# Patient Record
Sex: MALE | Race: WHITE | Employment: OTHER | URBAN - METROPOLITAN AREA
[De-identification: names, ages, dates, MRNs, and addresses within clinical notes are randomized per-mention and may not be internally consistent; named-entity substitution may affect disease eponyms.]

---

## 2021-06-03 PROBLEM — E87.5 HYPERKALEMIA: Status: ACTIVE | Noted: 2020-02-26

## 2021-06-03 PROBLEM — G60.9 IDIOPATHIC PERIPHERAL NEUROPATHY: Status: ACTIVE | Noted: 2021-06-03

## 2021-06-03 PROBLEM — M25.50 MULTIPLE JOINT PAIN: Status: ACTIVE | Noted: 2021-06-03

## 2021-06-03 PROBLEM — F90.0 ATTENTION DEFICIT HYPERACTIVITY DISORDER, PREDOMINANTLY INATTENTIVE TYPE: Status: ACTIVE | Noted: 2021-06-03

## 2021-06-03 PROBLEM — I10 ESSENTIAL HYPERTENSION: Status: ACTIVE | Noted: 2021-06-03

## 2021-06-03 PROBLEM — M72.2 PLANTAR FASCIITIS: Status: ACTIVE | Noted: 2021-06-03

## 2021-06-03 PROBLEM — R31.0 GROSS HEMATURIA: Status: ACTIVE | Noted: 2019-08-03

## 2021-06-03 PROBLEM — E78.5 HYPERLIPIDEMIA: Status: ACTIVE | Noted: 2021-06-03

## 2021-06-03 PROBLEM — N32.0 BLADDER OUTLET OBSTRUCTION: Status: ACTIVE | Noted: 2020-02-26

## 2021-06-03 PROBLEM — R97.20 RAISED PROSTATE SPECIFIC ANTIGEN: Status: ACTIVE | Noted: 2021-06-03

## 2022-04-05 PROBLEM — F34.1 DYSTHYMIA: Status: ACTIVE | Noted: 2022-04-05

## 2022-04-05 PROBLEM — R53.83 FATIGUE: Status: ACTIVE | Noted: 2022-04-05

## 2022-04-05 PROBLEM — Z90.79 S/P TURP (TRANSURETHRAL RESECTION OF PROSTATE): Status: ACTIVE | Noted: 2022-03-17

## 2022-08-02 ENCOUNTER — HOSPITAL ENCOUNTER (OUTPATIENT)
Dept: PHYSICAL THERAPY | Age: 82
Discharge: HOME | End: 2022-08-02
Payer: MEDICARE

## 2022-08-02 PROCEDURE — 97116 GAIT TRAINING THERAPY: CPT

## 2022-08-02 PROCEDURE — 97162 PT EVAL MOD COMPLEX 30 MIN: CPT

## 2022-08-02 NOTE — PROGRESS NOTES
[address] [place] [address] PHYSICAL THERAPY AT [address] [place] [address]  [facility], [address] ,[address], [address]  Phone: [phone]  Fax: [phone] / [address] [place] [address] PHYSICAL THERAPY SERVICES  Patient Name: Marv Arias :    Medical   Diagnosis: Gait instability [R26.81] Treatment Diagnosis: Gait instability    Onset Date: 2022      Referral Source: Elliot Wright, * Start of Care [facility]): 2022   Prior Hospitalization: See medical history Provider #: [id number]   Prior Level of Function: Previous fall history   Comorbidities: H/o depression, arthritis, HTN, hearing & visual impairment, CHF, TIA   Medications: Verified on Patient Summary List   The Plan of Care and following information is based on the information from the initial evaluation.   ==================================================================================  Assessment / key information:  Patient is a pleasant 80 y.o. male who presents to [facility] with c/o imbalance. Patient's wife reports progressive worsening of balance over this past year, specifically since most recent hospitalization for an aneurysm 2022. He lives in a 2 story home with his wife & resides on the bottom floor given difficulty with negotiating stairs to access the room of the garage. He is able to ascend the stairs using L handrail & 2 handed technique & nonrec pattern. He is currently ambulating with roller walker for limited ambulation outside of his home & for all household ambulation. He reports at least 2 falls this past year, 1 incident when he tripped over an object on the floor & required assist from his wife to stand. He reports second incident when he fell out of chair but had difficulty recalling circumstances around this fall. He denies c/o pain & reports primary c/o weakness in B LE & poor stamina overall.  Upon objective evaluation, patient demonstrates impaired of hip & ankle balance strategies, protective extension strategy is absent. MMT revealed  overall B LE strength at 4/5. He scored 25/56 on Dunn Balance Assessment indicating r/o fall with ADLs. He ambulates with rolling walker with forward trunk flexed posture as well as L lateral shift. V/c for upright posture & safe/appropriate distance from RW with ambulation around clinic. Patient can benefit PT interventions to improve posture, decrease risk of fall with ADLs & gait & to improve gait mechanics to facilitate return to unlimited ADLs, work activities & overall functional status.   ==================================================================================  Eval Complexity: History HIGH Complexity :3+ comorbidities / personal factors will impact the outcome/ POC ;  Examination  MEDIUM Complexity : 3 Standardized tests and measures addressing body structure, function, activity limitation and / or participation in recreation ; Presentation MEDIUM Complexity : Evolving with changing characteristics ;   Decision Making MEDIUM Complexity : FOTO score of 26-74; Overall Complexity MEDIUM  Problem List: pain affecting function, decrease ROM, decrease strength, edema affecting function, impaired gait/ balance, decrease ADL/ functional abilitiies, decrease activity tolerance, decrease flexibility/ joint mobility, and decrease transfer abilities   Treatment Plan may include any combination of the following: Therapeutic exercise, Therapeutic activities, Neuromuscular re-education, Physical agent/modality, Gait/balance training, Manual therapy, Aquatic therapy, Self Care training, Functional mobility training, Home safety training, and Stair training  Patient / Family readiness to learn indicated by: asking questions, trying to perform skills and interest  Persons(s) to be included in education: patient (P) and family support person (FSP);list wife  Barriers to Learning/Limitations: None  Measures taken:    Patient Goal (s): \"Strength & better mobility, balance\"   Patient self reported health status: fair  Rehabilitation Potential: fair  Short Term Goals: To be accomplished in  2  weeks:  1) Establish HEP to prevent further disability. 2) Patient will be able to maintain mSR with eyes opened for 30 seconds indicating improved use of hip balance strategy for safety with ambulation in challenging environments. 3) Patient will report no further incidence of falls with use of fall prevention techniques at home. 4) Patient will ambulate ~30 feet without v/c for upright posture & safety with use of RW. Long Term Goals: To be accomplished in  4  weeks:  1) Improve FOTO score from 53 points to > or = 56 points indicating improved tolerance with ADLs. 2) Patient to improve score on Dunn balance assessment from 25/56 to > or = 30/56 indicating decreased r/o fall with ADLs. 3) Patient will be able to maintain B SR with eyes opened for 16 seconds indicating improved use of hip balance strategy for safety with ambulation in challenging environments. 4) Improve B LE strength so that is patient able to negotiate steps using B hand rail & nonrec pattern to improve safety with stair negotiation at home. Frequency / Duration:   Patient to be seen  2-3  times per week for 4  weeks:  Patient / Caregiver education and instruction: self care, activity modification, brace/ splint application and exercises    Therapist Signature: HOANG Casillas cert MDT Date: 6/6/0959   Certification Period: 8-02-22 to 10-31-22 Time: 12:56 PM   =================================================================================  I certify that the above Physical Therapy Services are being furnished while the patient is under my care. I agree with the treatment plan and certify that this therapy is necessary.     Physician Signature:                                                             Date: Time:                                                                       Eric Bosworth, *

## 2022-08-02 NOTE — PROGRESS NOTES
PHYSICAL THERAPY - DAILY TREATMENT NOTE    Patient Name: Smitha Michel        Date: 2022  : 1940   YES Patient  Verified  Visit #:     Insurance: Payor: Jasmyn Henry / Plan: [place] MED[place] PART [place] & B / Product Type: Medicare /      In time: 12:50 P Out time: 1:35 P   Total Treatment Time: 45     BCBS/Medicare Time Tracking (below)   Total Timed Codes (min):  15 1:1 Treatment Time:  45     TREATMENT AREA =  Gait instability [R26.81]  SUBJECTIVE  Pain Level (on 0 to 10 scale):  0  / 10   Medication Changes/New allergies or changes in medical history, any new surgeries or procedures? NO    If yes, update Summary List   Subjective Functional Status/Changes:  []  No changes reported     See POC            Modalities Rationale:        min [] Estim, type/location:                                      []  att     []  unatt     []  w/US     []  w/ice    []  w/heat    min []  Mechanical Traction: type/lbs                   []  pro   []  sup   []  int   []  cont    []  before manual    []  after manual    min []  Ultrasound, settings/location:      min []  Iontophoresis w/ dexamethasone, location:                                               []  take home patch       []  in clinic    min []  Ice     []  Heat    location/position:     min []  Vasopneumatic Device, press/temp:    If using vaso (only need to measure limb vaso being performed on)      pre-treatment girth :       post-treatment girth :       measured at (landmark location) :      min []  Other:    [] Skin assessment post-treatment (if applicable):    []  intact    []  redness- no adverse reaction                  []redness - adverse reaction:        10 min Gait Training:  __50_ feet x 2 with __RW_ device on level surfaces with __SBA_ level of assistance, reviewed appropriate use of RW, v/c for upright posture as well as appropriate spacing between pt & RW with ambulation & with stand to sit transfers   Rationale:  To improve ambulation safety and efficiency in order to improve patient's ability to safely ambulate at home for self care. Billed With/As:   [] TE   [] TA   [] Neuro   [] Self Care Patient Education: [x] Review HEP    [] Progressed/Changed HEP based on:   [] positioning   [] body mechanics   [] transfers   [] heat/ice application    [] other:      Other Objective/Functional Measures:    See POC     Post Treatment Pain Level (on 0 to 10) scale:   0  / 10     ASSESSMENT  Assessment/Changes in Function:     See POC      []  See Progress Note/Recertification   Patient will continue to benefit from skilled PT services to modify and progress therapeutic interventions, address functional mobility deficits, address ROM deficits, address strength deficits, analyze and address soft tissue restrictions, analyze and cue movement patterns, analyze and modify body mechanics/ergonomics, assess and modify postural abnormalities, address imbalance/dizziness, and instruct in home and community integration to attain remaining goals.    Progress toward goals / Updated goals:    Progressing towards goals established at [address] [facility] [id number]  []  Upgrade activities as tolerated YES Continue plan of care   []  Discharge due to :    []  Other:      Therapist: Carlos Oconnell, PT    Date: 8/2/2022 Time: 6:44 PM     Future Appointments   Date Time Provider Porsha Adames   8/12/2022 10:30 AM Rosalie Das, PT [facility]   8/15/2022 10:30 AM [facility]

## 2022-08-12 ENCOUNTER — HOSPITAL ENCOUNTER (OUTPATIENT)
Dept: PHYSICAL THERAPY | Age: 82
Discharge: HOME | End: 2022-08-12
Payer: MEDICARE

## 2022-08-12 PROCEDURE — 97112 NEUROMUSCULAR REEDUCATION: CPT | Performed by: PHYSICAL THERAPIST

## 2022-08-12 PROCEDURE — 97110 THERAPEUTIC EXERCISES: CPT | Performed by: PHYSICAL THERAPIST

## 2022-08-12 NOTE — PROGRESS NOTES
PHYSICAL THERAPY - DAILY TREATMENT NOTE    Patient Name: Catrachito Rainey        Date: 2022  : 1940   YES Patient  Verified  Visit #:     Insurance: Payor: Rohini Dixon / Plan: [place] MEDICARE PART A & B / Product Type: Medicare /      In time: 10:30 Out time: 11:15   Total Treatment Time: 45     BCBS/Medicare Time Tracking (below)   Total Timed Codes (min):  45 1:1 Treatment Time:  45     TREATMENT AREA =  Gait instability [R26.81]    SUBJECTIVE  Pain Level (on 0 to 10 scale):  0-5  / 10   Medication Changes/New allergies or changes in medical history, any new surgeries or procedures? NO    If yes, update Summary List   Subjective Functional Status/Changes:  []  No changes reported     Pt reports lower hurt this morning, but no having any pain now.            Modalities Rationale:     -NA   min [] Estim, type/location:                                      []  att     []  unatt     []  w/US     []  w/ice    []  w/heat    min []  Mechanical Traction: type/lbs                   []  pro   []  sup   []  int   []  cont    []  before manual    []  after manual    min []  Ultrasound, settings/location:      min []  Iontophoresis w/ dexamethasone, location:                                               []  take home patch       []  in clinic    min []  Ice     []  Heat    location/position:     min []  Vasopneumatic Device, press/temp:     min []  Other:    [] Skin assessment post-treatment (if applicable):    []  intact    []  redness- no adverse reaction     []redness - adverse reaction:        30 min Therapeutic Exercise:  [x]  See flow sheet   Rationale:      increase ROM, increase strength, and improve coordination to improve the patients ability to increase activity tolerance       15 min Neuromuscular Re-ed: [x]  See flow sheet   Rationale:    improve coordination, improve balance, and increase proprioception to improve the patients ability to prevent falls    - min Gait Training:  ___ feet with ___ device on level surfaces with ___ level of assistance   Rationale: To improve ambulation safety and efficiency in order to improve patient's ability to safely ambulate at home for self care. Billed With/As:   [] TE   [] TA   [] Neuro   [] Self Care Patient Education: [x] Review HEP    [] Progressed/Changed HEP based on:   [] positioning   [] body mechanics   [] transfers   [] heat/ice application    [] other:      Other Objective/Functional Measures: Added LE AORM/strengthening/endurance and balance exercises per flowsheet     Post Treatment Pain Level (on 0 to 10) scale:   0  / 10     ASSESSMENT  Assessment/Changes in Function:     Able to complete all new exercises with only complains of fatigue, no back pain. Issued written HEP and shown wife how to help him with set-up in kitchen at sink for safety. []  See Progress Note/Recertification   Patient will continue to benefit from skilled PT services to modify and progress therapeutic interventions, address functional mobility deficits, address ROM deficits, address strength deficits, analyze and address soft tissue restrictions, analyze and cue movement patterns, analyze and modify body mechanics/ergonomics, assess and modify postural abnormalities, and address imbalance/dizziness to attain remaining goals. Progress toward goals / Updated goals: Showing good tolerance to low level LE exercises.      PLAN  [x]  Upgrade activities as tolerated YES Continue plan of care   []  Discharge due to :    []  Other:      Therapist: Damien Lamar PT    Date: 8/12/2022 Time: 7:38 AM     Future Appointments   Date Time Provider Porsha Adames   8/12/2022 10:30 AM Mitali Mcknight, PT [facility]   8/15/2022 10:30 AM [facility]

## 2022-08-15 ENCOUNTER — HOSPITAL ENCOUNTER (OUTPATIENT)
Dept: PHYSICAL THERAPY | Age: 82
Discharge: HOME | End: 2022-08-15
Payer: MEDICARE

## 2022-08-15 PROCEDURE — 97110 THERAPEUTIC EXERCISES: CPT | Performed by: PHYSICAL THERAPIST

## 2022-08-15 PROCEDURE — 97112 NEUROMUSCULAR REEDUCATION: CPT | Performed by: PHYSICAL THERAPIST

## 2022-08-15 NOTE — PROGRESS NOTES
PHYSICAL THERAPY - DAILY TREATMENT NOTE    Patient Name: Farrukh Brand        Date: 8/15/2022  : 1940   YES Patient  Verified  Visit #:   3   of   12  Insurance: Payor: Rosita Henry / Plan: [place] MEDICARE PART A & B / Product Type: Medicare /      In time: 10:30 Out time: 11:15   Total Treatment Time: 45     BCBS/Medicare Time Tracking (below)   Total Timed Codes (min):  45 1:1 Treatment Time:  45     TREATMENT AREA =  Gait instability [R26.81]    SUBJECTIVE  Pain Level (on 0 to 10 scale):  0-5  / 10   Medication Changes/New allergies or changes in medical history, any new surgeries or procedures? NO    If yes, update Summary List   Subjective Functional Status/Changes:  []  No changes reported     Pt reports noticing his balance has been improving since starting PT.   He gets tired some with HEP          Modalities Rationale:     -NA   min [] Estim, type/location:                                      []  att     []  unatt     []  w/US     []  w/ice    []  w/heat    min []  Mechanical Traction: type/lbs                   []  pro   []  sup   []  int   []  cont    []  before manual    []  after manual    min []  Ultrasound, settings/location:      min []  Iontophoresis w/ dexamethasone, location:                                               []  take home patch       []  in clinic    min []  Ice     []  Heat    location/position:     min []  Vasopneumatic Device, press/temp:     min []  Other:    [] Skin assessment post-treatment (if applicable):    []  intact    []  redness- no adverse reaction     []redness - adverse reaction:        35 min Therapeutic Exercise:  [x]  See flow sheet   Rationale:      increase ROM, increase strength, and improve coordination to improve the patients ability to increase activity tolerance       10 min Neuromuscular Re-ed: [x]  See flow sheet   Rationale:    improve coordination, improve balance, and increase proprioception to improve the patients ability to prevent falls    - min Gait Training:  ___ feet with ___ device on level surfaces with ___ level of assistance   Rationale: To improve ambulation safety and efficiency in order to improve patient's ability to safely ambulate at home for self care. Billed With/As:   [] TE   [] TA   [] Neuro   [] Self Care Patient Education: [x] Review HEP    [] Progressed/Changed HEP based on:   [] positioning   [] body mechanics   [] transfers   [] heat/ice application    [] other:      Other Objective/Functional Measures:    Pt had some increased sway when performing HHT in quiet standing. He had increased stability with FTEO on flat ground. Post Treatment Pain Level (on 0 to 10) scale:   0  / 10     ASSESSMENT  Assessment/Changes in Function:     Pt able to perform therex today without as mch for rest breaks. He noted fatigue in thigh, but no pain in his back. []  See Progress Note/Recertification   Patient will continue to benefit from skilled PT services to modify and progress therapeutic interventions, address functional mobility deficits, address ROM deficits, address strength deficits, analyze and address soft tissue restrictions, analyze and cue movement patterns, analyze and modify body mechanics/ergonomics, assess and modify postural abnormalities, and address imbalance/dizziness to attain remaining goals. Progress toward goals / Updated goals:    Making good progress with regaining static standing balance and overall LE endurance.      PLAN  [x]  Upgrade activities as tolerated YES Continue plan of care   []  Discharge due to :    []  Other:      Therapist: Rae Watkins, PT    Date: 8/15/2022 Time: 7:38 AM     Future Appointments   Date Time Provider Porsha Adames   8/15/2022 10:30 AM Stefan Conner, PT [facility]

## 2022-08-17 ENCOUNTER — HOSPITAL ENCOUNTER (OUTPATIENT)
Dept: PHYSICAL THERAPY | Age: 82
Discharge: HOME | End: 2022-08-17
Payer: MEDICARE

## 2022-08-17 PROCEDURE — 97112 NEUROMUSCULAR REEDUCATION: CPT

## 2022-08-17 PROCEDURE — 97110 THERAPEUTIC EXERCISES: CPT

## 2022-08-17 NOTE — PROGRESS NOTES
PHYSICAL THERAPY - DAILY TREATMENT NOTE    Patient Name: Nikolai Dorman        Date: 2022  : 1940   YES Patient  Verified  Visit #:      12  Insurance: Payor: Bryant Keyes / Plan: [place] MEDICARE PART A & B / Product Type: Medicare /      In time: 68 Out time:    Total Treatment Time: 40     BCBS/Medicare Time Tracking (below)   Total Timed Codes (min):  40 1:1 Treatment Time:  40     TREATMENT AREA =  Gait instability [R26.81]    SUBJECTIVE  Pain Level (on 0 to 10 scale):  0  / 10   Medication Changes/New allergies or changes in medical history, any new surgeries or procedures?     NO    If yes, update Summary List   Subjective Functional Status/Changes:  []  No changes reported     Not doing my best today after some issues at the dentist yesterday          Modalities Rationale:     decrease inflammation, decrease pain and increase tissue extensibility to improve patient's ability to perform ADLs   min [] Estim, type/location:                                     []  att     []  unatt     []  w/US     []  w/ice    []  w/heat    min []  Mechanical Traction: type/lbs                   []  pro   []  sup   []  int   []  cont    []  before manual    []  after manual    min []  Ultrasound, settings/location:      min []  Iontophoresis w/ dexamethasone, location:                                               []  take home patch       []  in clinic    min []  Ice     []  Heat    location/position:     min []  Vasopneumatic Device, press/temp: If using vaso (only need to measure limb vaso being performed on)      pre-treatment girth :       post-treatment girth :       measured at (landmark location) :      min []  Other:    [x] Skin assessment post-treatment (if applicable):    [x]  intact    [x]  redness- no adverse reaction     []redness - adverse reaction:        10 min Therapeutic Exercise:  [x]  See flow sheet   Rationale:      increase ROM and increase strength to improve the patients ability to perform unlimted ADLs     30 min Neuromuscular Re-ed: [x]  See flow sheet   Rationale:    improve coordination, improve balance, and increase proprioception to improve the patients postural awareness, stability and motor control    Billed With/As:   [x] TE   [] TA   [x] Neuro   [] Self Care Patient Education: [x] Review HEP    [] Progressed/Changed HEP based on:   [] positioning   [] body mechanics   [] transfers   [] heat/ice application    [] other:      Other Objective/Functional Measures:    See FS     Post Treatment Pain Level (on 0 to 10) scale:   0  / 10     ASSESSMENT  Assessment/Changes in Function:     Repeated cues needed to assume upright posture, glut recruitment to correct forward trunk lean/collapse, and look ahead not down to improve stability during all standing exercises. []  See Progress Note/Recertification   Patient will continue to benefit from skilled PT services to modify and progress therapeutic interventions, address functional mobility deficits, address ROM deficits, address strength deficits, analyze and address soft tissue restrictions, analyze and cue movement patterns, analyze and modify body mechanics/ergonomics, assess and modify postural abnormalities, address imbalance/dizziness and instruct in home and community integration to attain remaining goals.    Progress toward goals / Updated goals:    Progressing towards STG1 and 2     PLAN  [x]  Upgrade activities as tolerated YES Continue plan of care   []  Discharge due to :    []  Other:      Therapist: Vera Wright, PT, DPT, MTC, CMTPT    Date: 8/17/2022 Time: 7:08 PM     Future Appointments   Date Time Provider Porsha Adames   8/23/2022 10:30 AM Quin Espinoza PT [facility]   8/25/2022 11:45 AM Adolfo David PT [facility]   8/30/2022 11:15 AM [facility]   9/1/2022 11:45 AM KORI MarkPTKATHI [facility]   9/6/2022 10:15 AM KORI Mark [facility]   9/8/2022 10:15 AM Ramez Dhaliwal [facility]   9/13/2022 10:15 AM Abby Barron, PT [facility]   9/15/2022 10:15 AM Hanna Conrad, PT [facility]

## 2022-08-23 ENCOUNTER — HOSPITAL ENCOUNTER (OUTPATIENT)
Dept: PHYSICAL THERAPY | Age: 82
Discharge: HOME | End: 2022-08-23
Payer: MEDICARE

## 2022-08-23 PROCEDURE — 97530 THERAPEUTIC ACTIVITIES: CPT | Performed by: PHYSICAL THERAPIST

## 2022-08-23 PROCEDURE — 97110 THERAPEUTIC EXERCISES: CPT | Performed by: PHYSICAL THERAPIST

## 2022-08-23 NOTE — PROGRESS NOTES
PHYSICAL THERAPY - DAILY TREATMENT NOTE    Patient Name: Racquel Rogers        Date: 2022  : 1940   YES Patient  Verified  Visit #:      of   12  Insurance: Payor: Linda Arguelloors / Plan: [place] MEDICARE PART A & B / Product Type: Medicare /      In time: 10:30 Out time: 11:10   Total Treatment Time: 40     BCBS/Medicare Time Tracking (below)   Total Timed Codes (min):  40 1:1 Treatment Time:  40     TREATMENT AREA =  Gait instability [R26.81]    SUBJECTIVE  Pain Level (on 0 to 10 scale):  0-5  / 10   Medication Changes/New allergies or changes in medical history, any new surgeries or procedures? NO    If yes, update Summary List   Subjective Functional Status/Changes:  []  No changes reported     Pt reports wlaing in his house withotu walker the other day, and got a little anxious once he relaized it. He was able to walk, holding onto walls furniture without incident.           Modalities Rationale:     -NA   min [] Estim, type/location:                                      []  att     []  unatt     []  w/US     []  w/ice    []  w/heat    min []  Mechanical Traction: type/lbs                   []  pro   []  sup   []  int   []  cont    []  before manual    []  after manual    min []  Ultrasound, settings/location:      min []  Iontophoresis w/ dexamethasone, location:                                               []  take home patch       []  in clinic    min []  Ice     []  Heat    location/position:     min []  Vasopneumatic Device, press/temp:     min []  Other:    [] Skin assessment post-treatment (if applicable):    []  intact    []  redness- no adverse reaction     []redness - adverse reaction:        30 min Therapeutic Exercise:  [x]  See flow sheet   Rationale:      increase ROM, increase strength, and improve coordination to improve the patients ability to increase activity tolerance     10 min Therapeutic Activity:  Education on energy conservation and understanding of safety in using RW on days when he hurts or has less energy   Rationale:      increase ROM, increase strength, and improve coordination to improve the patients ability to increase activity tolerance     - min Neuromuscular Re-ed: []  See flow sheet   Rationale:    improve coordination, improve balance, and increase proprioception to improve the patients ability to prevent falls    - min Gait Training:  ___ feet with ___ device on level surfaces with ___ level of assistance   Rationale: To improve ambulation safety and efficiency in order to improve patient's ability to safely ambulate at home for self care. Billed With/As:   [] TE   [] TA   [] Neuro   [] Self Care Patient Education: [x] Review HEP    [] Progressed/Changed HEP based on:   [] positioning   [] body mechanics   [] transfers   [] heat/ice application    [] other:      Other Objective/Functional Measures:    Pt had a sharp L lower back pain on 3rd rep of L foot 4 inch toe tap today    Held several exercises due to pain/overall decrease energy levels    Educated on home safety and energy conservation     Post Treatment Pain Level (on 0 to 10) scale:   0  / 10     ASSESSMENT  Assessment/Changes in Function:     Pt having more pain and decreased tolerance to exercises today. He appeared to start to fall asleep when doing his standing heel raise exercises. T was given more rest breaks today, but ultimately decided to hold off several exercises. []  See Progress Note/Recertification   Patient will continue to benefit from skilled PT services to modify and progress therapeutic interventions, address functional mobility deficits, address ROM deficits, address strength deficits, analyze and address soft tissue restrictions, analyze and cue movement patterns, analyze and modify body mechanics/ergonomics, assess and modify postural abnormalities, and address imbalance/dizziness to attain remaining goals.    Progress toward goals / Updated goals:    Minimal progress today due to pain and decreased energy levels     PLAN  [x]  Upgrade activities as tolerated YES Continue plan of care   []  Discharge due to :    []  Other:      Therapist: Timur Sandoval, PT    Date: 8/23/2022 Time: 7:38 AM     Future Appointments   Date Time Provider Porsha Adames   8/23/2022 10:30 AM Gabe Ignacio, PT [facility]   8/25/2022 11:45 AM Kathy Odom, PT [facility]   8/30/2022 11:15 AM Gabe Ignacio, PT [facility]   9/1/2022 11:45 AM Kathy Odom, PT [facility]   9/6/2022 10:15 AM Kathy Odom, PT [facility]   9/8/2022 10:15 AM Kathy Odom, PT [facility]   9/13/2022 10:15 AM Kathy Odom, PT [facility]   9/15/2022 10:15 AM Hermes Conrad, PT [facility]

## 2022-08-25 ENCOUNTER — HOSPITAL ENCOUNTER (OUTPATIENT)
Dept: PHYSICAL THERAPY | Age: 82
Discharge: HOME | End: 2022-08-25
Payer: MEDICARE

## 2022-08-25 PROCEDURE — 97110 THERAPEUTIC EXERCISES: CPT

## 2022-08-25 PROCEDURE — 97530 THERAPEUTIC ACTIVITIES: CPT

## 2022-08-25 NOTE — PROGRESS NOTES
PHYSICAL THERAPY - DAILY TREATMENT NOTE    Patient Name: Nikolai Dorman        Date: 2022  : 1940   YES Patient  Verified  Visit #:      Insurance: Payor: Bryant Keyes / Plan: [place] MEDICARE PART A & B / Product Type: Medicare /      In time: 11:50 A Out time: 12:30 P   Total Treatment Time: 40     BCBS/Medicare Time Tracking (below)   Total Timed Codes (min):  40 1:1 Treatment Time:  40     TREATMENT AREA =  Gait instability [R26.81]    SUBJECTIVE  Pain Level (on 0 to 10 scale):  7  / 10   Medication Changes/New allergies or changes in medical history, any new surgeries or procedures? NO    If yes, update Summary List   Subjective Functional Status/Changes:  []  No changes reported     Patient's wife reports she gives him tramadol for his lower back pain, but this seems to make him sleepy. He has not had any tramadol today.             Modalities Rationale:        min [] Estim, type/location:                                      []  att     []  unatt     []  w/US     []  w/ice    []  w/heat    min []  Mechanical Traction: type/lbs                   []  pro   []  sup   []  int   []  cont    []  before manual    []  after manual    min []  Ultrasound, settings/location:      min []  Iontophoresis w/ dexamethasone, location:                                               []  take home patch       []  in clinic    min []  Ice     []  Heat    location/position:     min []  Vasopneumatic Device, press/temp:    If using vaso (only need to measure limb vaso being performed on)      pre-treatment girth :       post-treatment girth :       measured at (landmark location) :      min []  Other:    [] Skin assessment post-treatment (if applicable):    []  intact    []  redness- no adverse reaction                  []redness - adverse reaction:        30 min Therapeutic Exercise:  [x]  See flow sheet   Rationale:      increase ROM and increase strength to improve the patients ability to return to safe ADLs with decreased fall risk      10 min Therapeutic Activity: [x]  See flow sheet   Rationale:    improve coordination, improve balance, and increase proprioception to improve the patients ability to return to safe sit to stand transfers from chair to walker      Billed With/As:   [] TE   [] TA   [] Neuro   [] Self Care Patient Education: [x] Review HEP    [] Progressed/Changed HEP based on:   [] positioning   [] body mechanics   [] transfers   [] heat/ice application    [] other:      Other Objective/Functional Measures: Added seated hip flexion & Nustep today (at end of treatment)     Post Treatment Pain Level (on 0 to 10) scale:   7  / 10     ASSESSMENT  Assessment/Changes in Function:     Improved tolerance today, patient able to follow commands & less c/o fatigue. V/c required for upright posture for all standing exercises & rest breaks due to c/o LBP today      []  See Progress Note/Recertification   Patient will continue to benefit from skilled PT services to modify and progress therapeutic interventions, address functional mobility deficits, address ROM deficits, address strength deficits, analyze and address soft tissue restrictions, analyze and cue movement patterns, analyze and modify body mechanics/ergonomics, assess and modify postural abnormalities, and instruct in home and community integration to attain remaining goals.    Progress toward goals / Updated goals:    Progressing towards STG 3     PLAN  []  Upgrade activities as tolerated YES Continue plan of care   []  Discharge due to :    []  Other:      Therapist: Zena Hurley PT    Date: 8/25/2022 Time: 1:04 PM     Future Appointments   Date Time Provider Porsha Adames   8/30/2022 11:15 AM Yani Dee, PT [facility]   9/1/2022 11:45 AM [facility], PT [facility]   9/6/2022 10:15 AM [facility], PT [facility]   9/8/2022 10:15 AM [facility], PT [facility]   9/13/2022 10:15 AM Johanny Conrad, PT [facility]   9/15/2022 10:15 AM Vipul Conrad, PT [facility]

## 2022-08-30 ENCOUNTER — HOSPITAL ENCOUNTER (OUTPATIENT)
Dept: PHYSICAL THERAPY | Age: 82
Discharge: HOME | End: 2022-08-30
Payer: MEDICARE

## 2022-08-30 PROCEDURE — 97110 THERAPEUTIC EXERCISES: CPT | Performed by: PHYSICAL THERAPIST

## 2022-08-30 PROCEDURE — 97112 NEUROMUSCULAR REEDUCATION: CPT | Performed by: PHYSICAL THERAPIST

## 2022-08-30 NOTE — PROGRESS NOTES
PHYSICAL THERAPY - DAILY TREATMENT NOTE    Patient Name: Kiara Glass        Date: 2022  : 1940   YES Patient  Verified  Visit #:     Insurance: Payor: Thomas Velasquez / Plan: [place] MEDICARE PART A & B / Product Type: Medicare /      In time: 11:20 Out time: 12:00   Total Treatment Time: 40     BCBS/Medicare Time Tracking (below)   Total Timed Codes (min):  40 1:1 Treatment Time:  40     TREATMENT AREA =  Gait instability [R26.81]    SUBJECTIVE  Pain Level (on 0 to 10 scale):  5  / 10   Medication Changes/New allergies or changes in medical history, any new surgeries or procedures? NO    If yes, update Summary List   Subjective Functional Status/Changes:  []  No changes reported     Pt reports feeling \"stiff all over\" when first awakening this morning.           Modalities Rationale:     -NA   min [] Estim, type/location:                                      []  att     []  unatt     []  w/US     []  w/ice    []  w/heat    min []  Mechanical Traction: type/lbs                   []  pro   []  sup   []  int   []  cont    []  before manual    []  after manual    min []  Ultrasound, settings/location:      min []  Iontophoresis w/ dexamethasone, location:                                               []  take home patch       []  in clinic    min []  Ice     []  Heat    location/position:     min []  Vasopneumatic Device, press/temp:     min []  Other:    [] Skin assessment post-treatment (if applicable):    []  intact    []  redness- no adverse reaction     []redness - adverse reaction:        25 min Therapeutic Exercise:  [x]  See flow sheet   Rationale:      increase ROM, increase strength, and improve coordination to improve the patients ability to increase activity tolerance     - min Therapeutic Activity:  -   Rationale:      increase ROM, increase strength, and improve coordination to improve the patients ability to increase activity tolerance     15 min Neuromuscular Re-ed: [x] See flow sheet   Rationale:    improve coordination, improve balance, and increase proprioception to improve the patients ability to prevent falls    - min Gait Training:  ___ feet with ___ device on level surfaces with ___ level of assistance   Rationale: To improve ambulation safety and efficiency in order to improve patient's ability to safely ambulate at home for self care. Billed With/As:   [] TE   [] TA   [] Neuro   [] Self Care Patient Education: [x] Review HEP    [] Progressed/Changed HEP based on:   [] positioning   [] body mechanics   [] transfers   [] heat/ice application    [] other:      Other Objective/Functional Measures:    Pt educated on standing with staggered stance for increased stability when standing without holding onto RW (ie washing wash/dentures etc). Post Treatment Pain Level (on 0 to 10) scale:   0  / 10     ASSESSMENT  Assessment/Changes in Function:     Pt had improved exercise tolerance today. He was limited by fatigue, not by LBP. []  See Progress Note/Recertification   Patient will continue to benefit from skilled PT services to modify and progress therapeutic interventions, address functional mobility deficits, address ROM deficits, address strength deficits, analyze and address soft tissue restrictions, analyze and cue movement patterns, analyze and modify body mechanics/ergonomics, assess and modify postural abnormalities, and address imbalance/dizziness to attain remaining goals. Progress toward goals / Updated goals:    Improving tolerance to LE exercise and standing stability.      PLAN  [x]  Upgrade activities as tolerated YES Continue plan of care   []  Discharge due to :    []  Other:      Therapist: Damien Lamar PT    Date: 8/30/2022 Time: 7:38 AM     Future Appointments   Date Time Provider Porsha Adames   8/30/2022 11:15 AM Mitali Mcknight PT [facility]   9/1/2022 11:45 AM [facility]   9/6/2022 10:15 AM Hanna Conrad, PT [facility]   9/8/2022 10:15 AM Davide Berman, PT [facility]   9/13/2022 10:15 AM Davide Berman, PT [facility]   9/15/2022 10:15 AM Paul Conrad, PT [facility]

## 2022-09-01 ENCOUNTER — APPOINTMENT (OUTPATIENT)
Dept: PHYSICAL THERAPY | Age: 82
End: 2022-09-01
Payer: MEDICARE

## 2022-09-01 ENCOUNTER — TELEPHONE (OUTPATIENT)
Dept: PHYSICAL THERAPY | Age: 82
End: 2022-09-01

## 2022-09-06 ENCOUNTER — HOSPITAL ENCOUNTER (OUTPATIENT)
Dept: PHYSICAL THERAPY | Age: 82
Discharge: HOME | End: 2022-09-06
Payer: MEDICARE

## 2022-09-06 PROCEDURE — 97530 THERAPEUTIC ACTIVITIES: CPT

## 2022-09-06 PROCEDURE — 97112 NEUROMUSCULAR REEDUCATION: CPT

## 2022-09-06 PROCEDURE — 97110 THERAPEUTIC EXERCISES: CPT

## 2022-09-06 NOTE — PROGRESS NOTES
[address] [place] [address] PHYSICAL THERAPY AT [address] [place] [address] [facility], [address] [place] [address], [address]  Phone: [phone]  Fax: [phone]  PROGRESS NOTE  Patient Name: Kavya Perez :    Treatment/Medical Diagnosis: Gait instability [R26.81]   Referral Source: Lydia Singh, *     Date of Initial Visit: 22 Attended Visits: 7 Missed Visits: 1     SUMMARY OF TREATMENT  Therapeutic exercise including ROM, stretching, gentle strengthening, gait & balance training, postural ed, patient education, HEP instruction. CURRENT STATUS  Mr. Sean Zarate continues to make slow but steady progress with PT, tolerance to prolonged standing/upright activities continues to limited due to LBP, he continues to ambulate with RW for safety for all distances, v/c for upright posture, improved use of ankle balance strategies with PT. Tolerance to exercise continues to slowly improve. Goal/Measure of Progress Goal Met? 1.  Establish HEP to prevent further disability. Status at last Eval: NA Current Status: HEP established  yes   2. Patient will be able to maintain mSR with eyes opened for 30 seconds indicating improved use of hip balance strategy for safety with ambulation in challenging environments. Status at last Eval: unable Current Status: Patient able to maintain ROM position x 30 s progressing   3. Patient will report no further incidence of falls with use of fall prevention techniques at home. Status at last Eval: NA Current Status: No recent reported fall history yes   4. Patient will ambulate ~30 feet without v/c for upright posture & safety with use of RW. Status at last Eval: Forward flexed posture with ambulation in clinic Current Status: Patient continues to require v/c for all standing & ambulation in clinic Progressing      New Goals to be achieved in __3-4__  weeks:  1.   Patient will be able to maintain mSR with eyes opened for 30 seconds indicating improved use of hip balance strategy for safety with ambulation in challenging environments. 2.  Patient to improve score on Dunn balance assessment from 25/56 to > or = 30/56 indicating decreased r/o fall with ADLs. 3.  Improve B LE strength so that is patient able to negotiate steps using B hand rail & nonrec pattern to improve safety with stair negotiation at home. 4.  Improve FOTO score from 53 points to > or = 56 points indicating improved tolerance with ADLs. RECOMMENDATIONS  Patient can benefit from continued PT interventions in order to progress towards achieving towards all LTGs. If you have any questions/comments please contact us directly at [phone]. Thank you for allowing us to assist in the care of your patient. Therapist Signature: HOANG Pennington, cert MDT Date: 1/5/5979   Certification Period:  Reporting Period: 9-06-22 to 12-04-22 8-02-22 to 9-06-22 Time: 2:01 PM     NOTE TO PHYSICIAN:  PLEASE COMPLETE THE ORDERS BELOW AND FAX TO   [facility] Physical [facility]: [phone]. If you are unable to process this request in 24 hours please contact our office: [phone].    ___ I have read the above report and request that my patient continue as recommended.   ___ I have read the above report and request that my patient continue therapy with the following changes/special instructions:_________________________________________________________   ___ I have read the above report and request that my patient be discharged from therapy.      Physician Signature:                                                             Date:                                     Time:                                                                       Sonny Pino, *

## 2022-09-06 NOTE — PROGRESS NOTES
PHYSICAL THERAPY - DAILY TREATMENT NOTE    Patient Name: Farrukh Brand        Date: 2022  : 1940   YES Patient  Verified  Visit #:     Insurance: Payor: Rosita Henry / Plan: [place] MEDICARE PART A & B / Product Type: Medicare /      In time: 10:15 A Out time: 11:15 A   Total Treatment Time: 50     BCBS/Medicare Time Tracking (below)   Total Timed Codes (min):  50 1:1 Treatment Time:  45     TREATMENT AREA =  Gait instability [R26.81]    SUBJECTIVE  Pain Level (on 0 to 10 scale):  6  / 10   Medication Changes/New allergies or changes in medical history, any new surgeries or procedures?     NO    If yes, update Summary List   Subjective Functional Status/Changes:  []  No changes reported     See progress note to MD            Modalities Rationale:     Patient deferred   min [] Estim, type/location:                                      []  att     []  unatt     []  w/US     []  w/ice    []  w/heat    min []  Mechanical Traction: type/lbs                   []  pro   []  sup   []  int   []  cont    []  before manual    []  after manual    min []  Ultrasound, settings/location:      min []  Iontophoresis w/ dexamethasone, location:                                               []  take home patch       []  in clinic    min []  Ice     []  Heat    location/position:     min []  Vasopneumatic Device, press/temp:    If using vaso (only need to measure limb vaso being performed on)      pre-treatment girth :       post-treatment girth :       measured at (landmark location) :      min []  Other:    [] Skin assessment post-treatment (if applicable):    []  intact    []  redness- no adverse reaction                  []redness - adverse reaction:        20/  15 min Therapeutic Exercise:  [x]  See flow sheet   Rationale:      increase ROM and increase strength to improve the patients ability to tolerate prolonged standing with good posture     20 min Therapeutic Activity: [x]  See flow sheet Rationale:    increase ROM, increase strength, and increase proprioception to improve the patients ability to perform sit to stand transfers with supervision for safety    10 min Neuromuscular Re-ed: [x]  See flow sheet   Rationale:    improve coordination, improve balance, and increase proprioception to improve the patients ability to perform ADLs with decreased fall risk       Billed With/As:   [] TE   [] TA   [] Neuro   [] Self Care Patient Education: [x] Review HEP    [] Progressed/Changed HEP based on:   [] positioning   [] body mechanics   [] transfers   [] heat/ice application    [] other:      Other Objective/Functional Measures:    ROM 30 secs with eyes opened (arms at side)     Post Treatment Pain Level (on 0 to 10) scale:   0  / 10     ASSESSMENT  Assessment/Changes in Function:     Patient continues to require v/c for upright posture for all standing & balance exercises      []  See Progress Note/Recertification   Patient will continue to benefit from skilled PT services to modify and progress therapeutic interventions, address functional mobility deficits, address ROM deficits, address strength deficits, analyze and address soft tissue restrictions, analyze and cue movement patterns, analyze and modify body mechanics/ergonomics, assess and modify postural abnormalities, and instruct in home and community integration to attain remaining goals.    Progress toward goals / Updated goals:    Progressing towards STG 2, 4     PLAN  []  Upgrade activities as tolerated YES Continue plan of care   []  Discharge due to :    []  Other:      Therapist: Selene Brambila PT    Date: 9/6/2022 Time: 1:55 PM     Future Appointments   Date Time Provider Porsha Adames   9/8/2022 10:15 AM Brody Fine PT [facility]   9/13/2022 10:15 AM Brody Fine PT [facility]   9/15/2022 10:15 AM Tom Conrad PT [facility]

## 2022-09-08 ENCOUNTER — HOSPITAL ENCOUNTER (OUTPATIENT)
Dept: PHYSICAL THERAPY | Age: 82
Discharge: HOME | End: 2022-09-08
Payer: MEDICARE

## 2022-09-08 PROCEDURE — 97110 THERAPEUTIC EXERCISES: CPT

## 2022-09-08 PROCEDURE — 97530 THERAPEUTIC ACTIVITIES: CPT

## 2022-09-08 PROCEDURE — 97116 GAIT TRAINING THERAPY: CPT

## 2022-09-08 NOTE — PROGRESS NOTES
PHYSICAL THERAPY - DAILY TREATMENT NOTE    Patient Name: Lizandro Chavarria        Date: 2022  : 1940   YES Patient  Verified  Visit #:     Insurance: Payor: Dimitri Risk / Plan: [place] MED[place]ARE PART A & B / Product Type: Medicare /      In time: 10:15 A Out time: 11:00 A   Total Treatment Time: 40     BCBS/Medicare Time Tracking (below)   Total Timed Codes (min):  40 1:1 Treatment Time:  40     TREATMENT AREA =  Gait instability [R26.81]    SUBJECTIVE  Pain Level (on 0 to 10 scale):  0  / 10   Medication Changes/New allergies or changes in medical history, any new surgeries or procedures? NO    If yes, update Summary List   Subjective Functional Status/Changes:  []  No changes reported     Patient reports that his lower back is not hurting today, he feels like he walks better since adjusting walker to a higher position.            Modalities Rationale:        min [] Estim, type/location:                                      []  att     []  unatt     []  w/US     []  w/ice    []  w/heat    min []  Mechanical Traction: type/lbs                   []  pro   []  sup   []  int   []  cont    []  before manual    []  after manual    min []  Ultrasound, settings/location:      min []  Iontophoresis w/ dexamethasone, location:                                               []  take home patch       []  in clinic    min []  Ice     []  Heat    location/position:     min []  Vasopneumatic Device, press/temp:    If using vaso (only need to measure limb vaso being performed on)      pre-treatment girth :       post-treatment girth :       measured at (landmark location) :      min []  Other:    [] Skin assessment post-treatment (if applicable):    []  intact    []  redness- no adverse reaction                  []redness - adverse reaction:        15 min Therapeutic Exercise:  [x]  See flow sheet   Rationale:      increase ROM and increase strength to improve the patients ability to tolerate standing with improved posture     15 min Therapeutic Activity: [x]  See flow sheet   Rationale:    improve coordination, improve balance, and increase proprioception to improve the patients ability to perform sit to stand transfers      10 min Gait Training:  __200_ feet with __RW_ device on level surfaces with _supervision__ level of assistance   Rationale: To improve ambulation safety and efficiency in order to improve patient's ability to safely ambulate at home for self care. Billed With/As:   [] TE   [] TA   [] Neuro   [] Self Care Patient Education: [x] Review HEP    [] Progressed/Changed HEP based on:   [] positioning   [] body mechanics   [] transfers   [] heat/ice application    [] other:      Other Objective/Functional Measures:    See flow sheet     Post Treatment Pain Level (on 0 to 10) scale:   0  / 10     ASSESSMENT  Assessment/Changes in Function:     No increase in LBP today, v/c required for upright posture for gait training as well as maintaining speed with ambulation      []  See Progress Note/Recertification   Patient will continue to benefit from skilled PT services to modify and progress therapeutic interventions, address functional mobility deficits, address ROM deficits, address strength deficits, analyze and address soft tissue restrictions, analyze and cue movement patterns, analyze and modify body mechanics/ergonomics, assess and modify postural abnormalities, address imbalance/dizziness, and instruct in home and community integration to attain remaining goals.    Progress toward goals / Updated goals:    Progressing towards newly established LTGs      PLAN  []  Upgrade activities as tolerated YES Continue plan of care   []  Discharge due to :    []  Other:      Therapist: Kristen Mckinnon PT    Date: 9/8/2022 Time: 11:43 AM     Future Appointments   Date Time Provider Porsha Adames   9/13/2022 10:15 AM Bassem Shaikh, PT [facility]   9/15/2022 10:15 AM Damien Conrad, PT [facility]

## 2022-09-13 ENCOUNTER — HOSPITAL ENCOUNTER (OUTPATIENT)
Dept: PHYSICAL THERAPY | Age: 82
Discharge: HOME | End: 2022-09-13
Payer: MEDICARE

## 2022-09-13 PROCEDURE — 97116 GAIT TRAINING THERAPY: CPT

## 2022-09-13 PROCEDURE — 97110 THERAPEUTIC EXERCISES: CPT

## 2022-09-13 PROCEDURE — 97112 NEUROMUSCULAR REEDUCATION: CPT

## 2022-09-13 NOTE — PROGRESS NOTES
PHYSICAL THERAPY - DAILY TREATMENT NOTE    Patient Name: Chika Cross        Date: 2022  : 1940   YES Patient  Verified  Visit #:   10   of   12  Insurance: Payor: Hardeep Granger / Plan: [place] MEDICARE PART A & B / Product Type: Medicare /      In time: 10:20 A Out time: 11:05 A   Total Treatment Time: 40     BCBS/Medicare Time Tracking (below)   Total Timed Codes (min):  40 1:1 Treatment Time:  40     TREATMENT AREA =  Gait instability [R26.81]    SUBJECTIVE  Pain Level (on 0 to 10 scale):  5  / 10   Medication Changes/New allergies or changes in medical history, any new surgeries or procedures? NO    If yes, update Summary List   Subjective Functional Status/Changes:  []  No changes reported     Patient reports he has some pain in lower back. He has a hard time making L turns when he is walking, he loses his balance more than when he turns to the R with his walker.           Modalities Rationale:     Patient deferred   min [] Estim, type/location:                                      []  att     []  unatt     []  w/US     []  w/ice    []  w/heat    min []  Mechanical Traction: type/lbs                   []  pro   []  sup   []  int   []  cont    []  before manual    []  after manual    min []  Ultrasound, settings/location:      min []  Iontophoresis w/ dexamethasone, location:                                               []  take home patch       []  in clinic    min []  Ice     []  Heat    location/position:     min []  Vasopneumatic Device, press/temp:    If using vaso (only need to measure limb vaso being performed on)      pre-treatment girth :       post-treatment girth :       measured at (landmark location) :      min []  Other:    [] Skin assessment post-treatment (if applicable):    []  intact    []  redness- no adverse reaction                  []redness - adverse reaction:        10 min Therapeutic Exercise:  [x]  See flow sheet   Rationale:      increase ROM and increase strength to improve the patients ability to return to pain-free upright standing     15 min Neuromuscular Re-ed: [x]  See flow sheet   Rationale:    improve coordination, improve balance, and increase proprioception to improve the patients ability to return to safe ADLs with decreased fall risk     15 min Gait Training:  _see flow sheet__ feet with _RW__ device on level surfaces with __SBA_ level of assistance   Rationale: To improve ambulation safety and efficiency in order to improve patient's ability to safely ambulate at home for self care. Billed With/As:   [] TE   [] TA   [] Neuro   [] Self Care Patient Education: [x] Review HEP    [] Progressed/Changed HEP based on:   [] positioning   [] body mechanics   [] transfers   [] heat/ice application    [] other:      Other Objective/Functional Measures: Added gait training (using 4 cones) with emphasis on left turns (square position)     Post Treatment Pain Level (on 0 to 10) scale:   5  / 10     ASSESSMENT  Assessment/Changes in Function:     Good gait pivot turn with use of RW today when turning to L, patient continues to require v/c for upright posture, good tolerance to progression of static balance exercises      []  See Progress Note/Recertification   Patient will continue to benefit from skilled PT services to modify and progress therapeutic interventions, address functional mobility deficits, address ROM deficits, address strength deficits, analyze and address soft tissue restrictions, analyze and cue movement patterns, analyze and modify body mechanics/ergonomics, assess and modify postural abnormalities, and instruct in home and community integration to attain remaining goals.    Progress toward goals / Updated goals:    Progressing towards LTG 1     PLAN  []  Upgrade activities as tolerated YES Continue plan of care   []  Discharge due to :    []  Other:      Therapist: Elias Johnson PT    Date: 9/13/2022 Time: 11:00 AM     Future Appointments   Date Time Provider Porsha Adames   9/15/2022 10:15 AM Prisca Conrad, PT [facility]

## 2022-09-15 ENCOUNTER — HOSPITAL ENCOUNTER (OUTPATIENT)
Dept: PHYSICAL THERAPY | Age: 82
Discharge: HOME | End: 2022-09-15
Payer: MEDICARE

## 2022-09-15 PROCEDURE — 97110 THERAPEUTIC EXERCISES: CPT

## 2022-09-15 PROCEDURE — 97535 SELF CARE MNGMENT TRAINING: CPT

## 2022-09-15 PROCEDURE — 97116 GAIT TRAINING THERAPY: CPT

## 2022-09-15 NOTE — PROGRESS NOTES
[facility] [address] [place], [place] [address], [address] [place] [address], [address]  Phone: [phone]  Fax: [phone] FOR PHYSICAL THERAPY          Patient Name: Farrukh Nicolas :    Treatment/Medical Diagnosis: Gait instability [R26.81]   Onset Date: 2022    Referral Source: Dorie Ngo, * Start of Care [facility]): 22   Prior Hospitalization: See Medical History Provider #: [id number]   Prior Level of Function: Previous fall history   Comorbidities: H/o depression, arthritis, HTN, hearing & visual impairment, CHF, TIA   Medications: Verified on Patient Summary List   Visits from [facility]: 11 Missed Visits: 1     Goal/Measure of Progress Goal Met? 1. Patient will be able to maintain mSR with eyes opened for 30 seconds indicating improved use of hip balance strategy for safety with ambulation in challenging environments. Status at last Eval: unable Current Status: B mSR 30 secs eyes opened yes   2. Patient to improve score on Dunn balance assessment from 25/56 to > or = 30/56 indicating decreased r/o fall with ADLs. Status at last Eval: 25 Current Status: 32/56 yes   3. Improve B LE strength so that is patient able to negotiate steps using B hand rail & nonrec pattern to improve safety with stair negotiation at home. Status at last Eval: unable Current Status: Patient able as ascend using B HR & rec pattern, descend using nonrec pattern yes   4. Improve FOTO score from 53 points to > or = 56 points indicating improved tolerance with ADLs. Status at last Eval: 48 Current Status: 60 yes     Key Functional Changes/Progress: Mr. Marita Barton has made good progress with PT, he demonstrates improved use of ankle/hip balance strategies & primarily uses RW for support for all ambulation. Discussed use of rollator walker to improve tolerance & stamina with prolonged upright activites & taking rest breaks in sitting.  We also dicussed transition to senior based fitness program (I.e. at the recreational center) to improve consistency with weekly generalized exercise. Assessments/Recommendations: Discontinue therapy. Progressing towards or have reached established goals. If you have any questions/comments please contact us directly at [phone]. Thank you for allowing us to assist in the care of your patient.     Therapist Signature: HOANG Hickey, cert MDT Date: 9-76-02   Reporting Period:  Time: 11:01 AM

## 2022-09-15 NOTE — PROGRESS NOTES
PHYSICAL THERAPY - DAILY TREATMENT NOTE    Patient Name: Racquel Rogers        Date: 9/15/2022  : 1940   YES Patient  Verified  Visit #:     Insurance: Payor: Linda Arguelloors / Plan: [place] MEDICARE PART A & B / Product Type: Medicare /      In time: 10:20 A Out time: 11:25 A   Total Treatment Time: 50     BCBS/Medicare Time Tracking (below)   Total Timed Codes (min):  50 1:1 Treatment Time:  40     TREATMENT AREA =  Gait instability [R26.81]    SUBJECTIVE  Pain Level (on 0 to 10 scale):  0  / 10   Medication Changes/New allergies or changes in medical history, any new surgeries or procedures?     NO    If yes, update Summary List   Subjective Functional Status/Changes:  []  No changes reported     See DC summary           Modalities Rationale:        min [] Estim, type/location:                                      []  att     []  unatt     []  w/US     []  w/ice    []  w/heat    min []  Mechanical Traction: type/lbs                   []  pro   []  sup   []  int   []  cont    []  before manual    []  after manual    min []  Ultrasound, settings/location:      min []  Iontophoresis w/ dexamethasone, location:                                               []  take home patch       []  in clinic    min []  Ice     []  Heat    location/position:     min []  Vasopneumatic Device, press/temp:    If using vaso (only need to measure limb vaso being performed on)      pre-treatment girth :       post-treatment girth :       measured at (landmark location) :      min []  Other:    [] Skin assessment post-treatment (if applicable):    []  intact    []  redness- no adverse reaction                  []redness - adverse reaction:        20/  15 min Therapeutic Exercise:  [x]  See flow sheet   Rationale:      increase ROM and increase strength to improve the patients ability to perform sit to stand transfers     20/  15 min Self Care: Reviewed transition to senior based fitness program, use of rollator walker to improve stamina & tolerance to upright postures   Rationale:    increase strength, improve coordination, improve balance, and increase proprioception to improve the patients ability to return to     10 min Gait Training:  _200__ feet with _RW__ device on level surfaces with __supervision_ level of assistance   Rationale: To improve ambulation safety and efficiency in order to improve patient's ability to safely ambulate at home for self care. Billed With/As:   [] TE   [] TA   [] Neuro   [] Self Care Patient Education: [x] Review HEP    [] Progressed/Changed HEP based on:   [] positioning   [] body mechanics   [] transfers   [] heat/ice application    [] other:      Other Objective/Functional Measures:    FOTO 60 points  Dunn Balance 32/56     Post Treatment Pain Level (on 0 to 10) scale:   0  / 10     ASSESSMENT  Assessment/Changes in Function:     Patient is pleased with his progress & feels ready for DC to HEP     []  See Progress Note/Recertification   Patient will continue to benefit from skilled PT services to instruct in home and community integration to attain remaining goals. Progress toward goals / Updated goals: All goals met      PLAN  []  Upgrade activities as tolerated NO Continue plan of care   []  Discharge due to : All goals met   []  Other:      Therapist: Kristen Mckinnon, PT    Date: 9/15/2022 Time: 11:01 AM     No future appointments.